# Patient Record
Sex: MALE | Race: WHITE | NOT HISPANIC OR LATINO | URBAN - METROPOLITAN AREA
[De-identification: names, ages, dates, MRNs, and addresses within clinical notes are randomized per-mention and may not be internally consistent; named-entity substitution may affect disease eponyms.]

---

## 2017-08-05 ENCOUNTER — OFFICE VISIT - HEALTHEAST (OUTPATIENT)
Dept: FAMILY MEDICINE | Facility: CLINIC | Age: 32
End: 2017-08-05

## 2017-08-05 DIAGNOSIS — J32.9 SINUSITIS: ICD-10-CM

## 2017-08-05 DIAGNOSIS — Z87.09 HISTORY OF ASTHMA: ICD-10-CM

## 2017-08-05 DIAGNOSIS — H66.93 BILATERAL OTITIS MEDIA: ICD-10-CM

## 2017-08-05 RX ORDER — LORATADINE 10 MG/1
10 TABLET ORAL DAILY
Status: SHIPPED | COMMUNITY
Start: 2017-08-05

## 2021-05-31 VITALS — BODY MASS INDEX: 34.67 KG/M2 | WEIGHT: 241.6 LBS

## 2021-06-16 PROBLEM — Z87.09 HISTORY OF ASTHMA: Status: ACTIVE | Noted: 2017-08-05

## 2021-06-25 NOTE — PROGRESS NOTES
Progress Notes by Brennan Hanley DO at 8/5/2017  3:30 PM     Author: Brennan Hanley DO Service: -- Author Type: Physician    Filed: 8/5/2017  9:49 PM Encounter Date: 8/5/2017 Status: Signed    : Brennan Hanley DO (Physician)       Chief Complaint   Patient presents with   ? Dizziness     floating sensation when looking up and bending over. Started this morning.    ? Nasal Congestion     possibe allergies, treats with claritin     History of Present Illness: Nursing notes reviewed. Patient last used loratadine about 1 week ago. He refrains from taking his due to it making him drowsy. No ear discomfort. Patient does have albuterol to use if needed. No shortness of breath at exam.    Review of systems: See history of present illness, otherwise negative.     Current Outpatient Prescriptions   Medication Sig Dispense Refill   ? albuterol (PROVENTIL HFA;VENTOLIN HFA) 90 mcg/actuation inhaler Inhale 2 puffs every 6 (six) hours as needed for wheezing.     ? loratadine (CLARITIN) 10 mg tablet Take 10 mg by mouth daily.     ? amoxicillin-clavulanate (AUGMENTIN) 875-125 mg per tablet Take 1 tablet by mouth 2 (two) times a day for 10 days. 20 tablet 0     No current facility-administered medications for this visit.        No past medical history on file.   No past surgical history on file.   Social History     Social History   ? Marital status: Single     Spouse name: N/A   ? Number of children: N/A   ? Years of education: N/A     Social History Main Topics   ? Smoking status: Never Smoker   ? Smokeless tobacco: None   ? Alcohol use None   ? Drug use: None   ? Sexual activity: Not Asked     Other Topics Concern   ? None     Social History Narrative       History   Smoking Status   ? Never Smoker   Smokeless Tobacco   ? Not on file      Exam:   Blood pressure 114/76, pulse 68, temperature 97  F (36.1  C), temperature source Oral, weight (!) 241 lb 9.6 oz (109.6 kg), SpO2 96 %.    EXAM:   General: Vital signs reviewed.  Patient is in no acute appearing distress. He gets up and down normally from exam room chair and table. Breathing is non labored appearing. Patient is alert and oriented x 3.   Tympanic membrane of bilateral ears are dull and injected. Nasal turbinates are infected and edematous, with Increased rhinorrhea noted. No pharyngeal injection or exudate noted.  Neck: supple with no adenopathy.  Heart: Normal rate and rhythm without murmur  Lungs: Clear to auscultation with good air flow bilaterally.  Skin: warm and dry    Assessment/Plan   1. Bilateral otitis media  amoxicillin-clavulanate (AUGMENTIN) 875-125 mg per tablet   2. History of asthma     3. Sinusitis  amoxicillin-clavulanate (AUGMENTIN) 875-125 mg per tablet       Patient Instructions   Try OTC Allegra for allergy relief, which may cause less drowsiness. Using a steroid nasal spray is also an option for allergy relief. Start oral antibiotic today. I think the dizziness is related to an ear infection.    Causes of Sinusitis    Mucus helps keep your sinuses clean. But mucus may build up in the sinuses because of colds, allergies, or blockages. These things get in the way of the natural drainage of mucus. This may lead to sinusitis. Sinusitis means sinus inflammation and infection.    Acute sinusitis comes on suddenly. It often happens right after an upper respiratory infection, such as a cold. Viruses cause most acute sinus infections.    Chronic sinusitis is ongoing swelling of the sinus lining. Doctors don't know what causes chronic sinusitis.  Colds and other infections  A cold or flu may cause your sinus and nasal linings to swell. Sinus openings can become blocked. This causes mucus to back up. This backed-up mucus becomes an ideal place for bacteria to grow. Thick, yellow, or discolored mucus is one sign of infection.  Allergic reactions  You may be sensitive to certain substances. This causes the release of histamine in the body. Histamine makes your sinus  and nasal linings swell. Long-term swelling clogs your sinuses. It prevents the tiny hairs (cilia) in the nasal lining from sweeping away mucus. Allergy symptoms can continue over time. But theyre less severe than with colds.    Blockages    A polyp is a sac of swollen tissue. It can be the result of an allergy or infection. It may block the opening where most of your sinuses drain (middle meatus). It may even grow large enough to block your nose.    A deviated septum is when the thin wall inside your nose is pushed to one side. It is often the result of injury. This can block your middle meatus.  People with chronic nasal problems or allergies are more likely to get acute sinusitis. Sinusitis is also more common if you have a weakened immune system, such as with HIV. You are also more likely to get sinusitis if you have cystic fibrosis or another condition that causes your body to make extra mucus.  Date Last Reviewed: 10/1/2016    3817-0361 [employer]. [address] [place] [address], [place] [address]. All rights reserved. This information is not intended as a substitute for professional medical care. Always follow your healthcare professional's instructions.        Otitis Media (Middle-Ear Infection) in Adults  Otitis media is another name for a middle-ear infection. It means an infection behind your eardrum. This kind of ear infection can happen after any condition that keeps fluid from draining from the middle ear. These conditions include allergies, a cold, a sore throat, or a respiratory infection.  Middle-ear infections are common in children, but they can also happen in adults. An ear infection in an adult may mean a more serious problem than in a child. So you may need additional tests. If you have an ear infection, you should see your health care provider for treatment.  What are the types of middle-ear infections?  Infections can affect the middle ear in several ways. They are:    Acute otitis  media. This middle-ear infection occurs suddenly. It causes swelling and redness. Fluid and mucus become trapped inside the ear. You can have a fever and ear pain.    Otitis media with effusion. Fluid (effusion) and mucus build up in the middle ear after the infection goes away. You may feel like your middle ear is full. This can continue for months and may affect your hearing.    Chronic otitis media with effusion. Fluid (effusion) remains in the middle ear for a long time. Or it builds up again and again, even though there is no infection. This type of middle-ear infection may be hard to treat. It may also affect your hearing.  Who is more likely to get a middle-ear infection?  You are more likely to get an ear infection if you:    Smoke or are around someone who smokes    Have seasonal or year-round allergy symptoms    Have a cold or other upper respiratory infection  What causes a middle-ear infection?  The middle ear connects to the throat by a canal called the eustachian tube. This tube helps even out the pressure between the outer ear and the inner ear. A cold or allergy can irritate the tube or cause the area around it to swell. This can keep fluid from draining from the middle ear. The fluid builds up behind the eardrum. Bacteria and viruses can grow in this fluid. The bacteria and viruses cause the middle-ear infection.  What are the symptoms of a middle-ear infection?  Common symptoms of a middle-ear infection in adults are:    Pain in 1 or both ears    Drainage from the ear    Muffled hearing    Sore throat   You may also have a fever. Rarely, your balance can be affected.  These symptoms may be the same as for other conditions. Its important to talk with your health care provider if you think you have a middle-ear infection. If you have a high fever, severe pain behind your ear, or paralysis in your face, see your provider as soon as you can.  How is a middle-ear infection diagnosed?  Your health care  provider will take a medical history and do a physical exam. He or she will look at the outer ear and eardrum with an otoscope. The otoscope is a lighted tool that lets your provider see inside the ear. A pneumatic otoscope blows a puff of air into the ear to check how well your eardrum moves. If you eardrum doesnt move well, it may mean you have fluid behind it.  Your provider may also do a test called tympanometry. This test tells how well the middle ear is working. It can find any changes in pressure in the middle ear. Your provider may test your hearing with a tuning fork.  How is a middle-ear infection treated?  A middle-ear infection may be treated with:    Antibiotics, taken by mouth or as ear drops    Medication for pain    Decongestants, antihistamines, or nasal steroids  Your health care provider may also have you try autoinsufflation. This helps adjust the air pressure in your ear. For this, you pinch your nose and gently exhale. This forces air back through the eustachian tube.  The exact treatment for your ear infection will depend on the type of infection you have. In general, if your symptoms dont get better in 48 to 72 hours, contact your health care provider.  Middle-ear infections can cause long-term problems if not treated. They can lead to:    Infection in other parts of the head    Permanent hearing loss    Paralysis of a nerve in your face  If you have a middle-ear infection that doesnt get better, you may need to see an ear, nose, and throat specialist (otolaryngologist). You may need a CT scan or MRI to check for head and neck cancer.  Ear tubes  Sometimes fluid stays in the middle ear even after you take antibiotics and the infection goes away. In this case, your health care provider may suggest that a small tube be placed in your ear. The tube is put at the opening of the eardrum. The tube keeps fluid from building up and relieves pressure in the middle ear. It can also help you hear  better. This surgery is called myringotomy. It is not often done in adults.  The tubes usually fall out on their own after 6 months to a year.    0513-8032 [employer]. [address] [place] [address], [place] [address]. All rights reserved. This information is not intended as a substitute for professional medical care. Always follow your healthcare professional's instructions.           Brennan Hanley, DO